# Patient Record
Sex: MALE | Race: WHITE | URBAN - METROPOLITAN AREA
[De-identification: names, ages, dates, MRNs, and addresses within clinical notes are randomized per-mention and may not be internally consistent; named-entity substitution may affect disease eponyms.]

---

## 2021-03-02 ENCOUNTER — OFFICE VISIT (OUTPATIENT)
Dept: URBAN - METROPOLITAN AREA CLINIC 33 | Facility: CLINIC | Age: 36
End: 2021-03-02
Payer: COMMERCIAL

## 2021-03-02 DIAGNOSIS — T15.12XA FOREIGN BODY IN CONJUNCTIVAL SAC OF LT EYE, INITIAL ENCOUNTER: ICD-10-CM

## 2021-03-02 DIAGNOSIS — E11.9 TYPE 2 DIABETES MELLITUS W/O COMPLICATION: Primary | ICD-10-CM

## 2021-03-02 PROCEDURE — 99204 OFFICE O/P NEW MOD 45 MIN: CPT | Performed by: OPTOMETRIST

## 2021-03-02 ASSESSMENT — INTRAOCULAR PRESSURE
OD: 16
OS: 17

## 2021-03-02 NOTE — IMPRESSION/PLAN
Impression: Type 2 diabetes mellitus w/o complication: X16.4. Plan: Diabetes w/o Complications: No signs of diabetic retinopathy noted. No treatment necessary at this time. Patient was instructed to monitor vision for sudden changes and to call if visual changes noted. Discussed ocular and systemic benefits of blood sugar control. Continue management with PCP. Letter sent to PCP regarding status of ocular health.

## 2021-03-02 NOTE — IMPRESSION/PLAN
Impression: Foreign body in conjunctival sac of lt eye, initial encounter: T15.12XA. Plan: Patient reports getting metal in left eye 2 years ago but did not have insurance. Patient reports coming here today to discuss concerns of having it stay in the eye. Patient denies redness, pain, or decreased vision. No signs of inflammation. Metallic FB appears small and pinpoint w/ conjunctival overgrowth.  Discussed case with Dr. Nathen Barron, recommend against removal. Monitor only